# Patient Record
Sex: FEMALE | Race: WHITE | NOT HISPANIC OR LATINO | Employment: PART TIME | RURAL
[De-identification: names, ages, dates, MRNs, and addresses within clinical notes are randomized per-mention and may not be internally consistent; named-entity substitution may affect disease eponyms.]

---
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
## 2024-02-21 ENCOUNTER — PATIENT OUTREACH (OUTPATIENT)
Dept: ADMINISTRATIVE | Facility: HOSPITAL | Age: 19
End: 2024-02-21

## 2024-02-21 NOTE — PROGRESS NOTES
Health maintenance record review for population health care gaps    Patient last visit was on 8/22/23 and decline chlamydia screen

## 2024-04-16 ENCOUNTER — HOSPITAL ENCOUNTER (EMERGENCY)
Facility: HOSPITAL | Age: 19
Discharge: HOME | End: 2024-04-16

## 2024-04-16 VITALS
SYSTOLIC BLOOD PRESSURE: 114 MMHG | WEIGHT: 110 LBS | HEART RATE: 93 BPM | DIASTOLIC BLOOD PRESSURE: 64 MMHG | TEMPERATURE: 98 F | RESPIRATION RATE: 16 BRPM | HEIGHT: 65 IN | BODY MASS INDEX: 18.33 KG/M2 | OXYGEN SATURATION: 100 %

## 2024-04-16 DIAGNOSIS — N39.0 URINARY TRACT INFECTION WITHOUT HEMATURIA, SITE UNSPECIFIED: ICD-10-CM

## 2024-04-16 DIAGNOSIS — B96.89 BACTERIAL VAGINOSIS: Primary | ICD-10-CM

## 2024-04-16 DIAGNOSIS — N76.0 BACTERIAL VAGINOSIS: Primary | ICD-10-CM

## 2024-04-16 LAB
BACTERIA #/AREA URNS HPF: ABNORMAL /HPF
BACTERIA VAG QL WET PREP: ABNORMAL /HPF
BILIRUB UR QL STRIP: NEGATIVE
CLARITY UR: ABNORMAL
CLUE CELLS VAG QL WET PREP: ABNORMAL /HPF
COLOR UR: YELLOW
GLUCOSE UR STRIP-MCNC: NEGATIVE MG/DL
HCG UR QL IA.RAPID: NEGATIVE
KETONES UR STRIP-SCNC: NEGATIVE MG/DL
LEUKOCYTE ESTERASE UR QL STRIP: NEGATIVE
MUCOUS THREADS #/AREA URNS HPF: ABNORMAL /HPF
NITRITE UR QL STRIP: NEGATIVE
PH UR STRIP: 5.5 PH UNITS
PROT UR QL STRIP: NEGATIVE
RBC # UR STRIP: ABNORMAL /UL
RBC #/AREA URNS HPF: ABNORMAL /HPF
RBC #/AREA VAG WET PREP: ABNORMAL /HPF
SP GR UR STRIP: >=1.03
SQUAMOUS #/AREA URNS LPF: ABNORMAL /LPF
SQUAMOUS EPITHELIALS WET WOUNT, GENITAL: ABNORMAL /HPF
T VAGINALIS VAG QL WET PREP: ABNORMAL /HPF
UROBILINOGEN UR STRIP-ACNC: 0.2 MG/DL
WBC #/AREA URNS HPF: ABNORMAL /HPF
WBC CLUMPS WET MOUNT, GENITAL: ABNORMAL /HPF
WBC VAG QL WET PREP: ABNORMAL /HPF
YEAST VAG QL WET PREP: ABNORMAL /HPF

## 2024-04-16 PROCEDURE — 81003 URINALYSIS AUTO W/O SCOPE: CPT | Performed by: NURSE PRACTITIONER

## 2024-04-16 PROCEDURE — 99284 EMERGENCY DEPT VISIT MOD MDM: CPT

## 2024-04-16 PROCEDURE — 99284 EMERGENCY DEPT VISIT MOD MDM: CPT | Mod: ,,, | Performed by: NURSE PRACTITIONER

## 2024-04-16 PROCEDURE — 81025 URINE PREGNANCY TEST: CPT | Performed by: NURSE PRACTITIONER

## 2024-04-16 PROCEDURE — 87210 SMEAR WET MOUNT SALINE/INK: CPT | Performed by: NURSE PRACTITIONER

## 2024-04-16 PROCEDURE — 87591 N.GONORRHOEAE DNA AMP PROB: CPT | Performed by: NURSE PRACTITIONER

## 2024-04-16 RX ORDER — SULFAMETHOXAZOLE AND TRIMETHOPRIM 800; 160 MG/1; MG/1
1 TABLET ORAL 2 TIMES DAILY
Qty: 6 TABLET | Refills: 0 | Status: SHIPPED | OUTPATIENT
Start: 2024-04-16 | End: 2024-04-19

## 2024-04-16 RX ORDER — METRONIDAZOLE 500 MG/1
500 TABLET ORAL EVERY 12 HOURS
Qty: 14 TABLET | Refills: 0 | Status: SHIPPED | OUTPATIENT
Start: 2024-04-16 | End: 2024-04-23

## 2024-04-16 NOTE — DISCHARGE INSTRUCTIONS
-Take Flagyl 500 mg one tab by mouth twice a day x 7 days and complete.  -Do not drink alcohol while on this medication  -Take Bactrim DS twice a day x 3 days  -Increase water intake  -Shower, no tub baths

## 2024-04-16 NOTE — ED TRIAGE NOTES
Patient arrived to the ER c/o of painful urination with lower right back pain that started yesterday. Patient stated she has taken AZO however it has not worked.

## 2024-04-16 NOTE — ED PROVIDER NOTES
Encounter Date: 4/16/2024       History     Chief Complaint   Patient presents with    Dysuria     17 y/o WF arrived to the ED via POV with complaint of dysuria and urinary frequency that started yesterday. Having right lower back and lower abdominal pain today. Has been taking AZO with little relief. Denies fever, discharge, nausea or vomiting. Patient is sexually active with one partner. No history of STD.    The history is provided by the patient.     Review of patient's allergies indicates:  No Known Allergies  No past medical history on file.  No past surgical history on file.  No family history on file.  Social History     Tobacco Use    Smoking status: Never    Smokeless tobacco: Never   Substance Use Topics    Alcohol use: Never    Drug use: Never     Review of Systems   Gastrointestinal:  Positive for abdominal pain. Negative for constipation, diarrhea, nausea and vomiting.   Genitourinary:  Positive for dysuria, flank pain and frequency. Negative for hematuria, pelvic pain, vaginal bleeding and vaginal discharge.   Musculoskeletal:  Positive for back pain. Negative for myalgias.   All other systems reviewed and are negative.      Physical Exam     Initial Vitals [04/16/24 1449]   BP Pulse Resp Temp SpO2   114/64 93 16 97.9 °F (36.6 °C) 100 %      MAP       --         Physical Exam    Nursing note and vitals reviewed.  Constitutional: Vital signs are normal. She appears well-developed and well-nourished. She is cooperative. She does not have a sickly appearance. She does not appear ill.   Cardiovascular:  Normal rate, regular rhythm and normal heart sounds.           Pulmonary/Chest: Effort normal and breath sounds normal.   Abdominal: Abdomen is soft. Bowel sounds are normal. There is abdominal tenderness in the suprapubic area.   No right CVA tenderness.  No left CVA tenderness. There is no rebound and no guarding.   Musculoskeletal:      Cervical back: Normal.      Thoracic back: Normal.      Lumbar  back: Normal.     Neurological: She is alert and oriented to person, place, and time.   Skin: Skin is warm, dry and intact. Capillary refill takes less than 2 seconds.   Psychiatric: She has a normal mood and affect. Her speech is normal and behavior is normal. Judgment normal.         Medical Screening Exam   See Full Note    ED Course   Procedures  Labs Reviewed   WET PREP, GENITAL - Abnormal; Notable for the following components:       Result Value    WBC [place] Rare (*)     Bacteria [place] Few (*)     Clue Cell [place] Few (*)     Squamous Epithelials [place] Many (*)     All other components within normal limits   URINALYSIS, REFLEX TO URINE CULTURE - Abnormal; Notable for the following components:    Blood, UA Small (*)     Specific Gravity, UA >=1.030 (*)     All other components within normal limits   URINALYSIS, MICROSCOPIC - Abnormal; Notable for the following components:    WBC, UA 5-10 (*)     RBC, UA 5-10 (*)     Bacteria, UA Few (*)     Squamous Epithelial Cells, UA Moderate (*)     Mucus, UA Moderate (*)     All other components within normal limits   HCG QUALITATIVE URINE - Normal   CHLAMYDIA/GONORRHOEAE(GC), PCR          Imaging Results    None          Medications - No data to display  Medical Decision Making  19 y/o WF arrived to the ED via POV with complaint of dysuria and urinary frequency that started yesterday. Having right lower back and lower abdominal pain today. Has been taking AZO with little relief. Denies fever, discharge, nausea or vomiting. Patient is sexually active with one partner. No history of STD.    Problems Addressed:  Bacterial vaginosis:     Details: -Take Flagyl 500 mg one tab by mouth twice a day x 7 days and complete.  -Do not drink alcohol while on this medication    Urinary tract infection without hematuria, site unspecified:     Details: -Take Bactrim DS twice a day x 3 days  -Increase water intake  -Shower, no tub baths    Amount and/or Complexity of Data  Reviewed  Labs: ordered.     Details: Labs Reviewed  WET PREP, GENITAL - Abnormal; Notable for the following components:     WBC Wet Mount                 Rare (*)               Bacteria Wet Mount            Few (*)                Clue Cell Wet Mount           Few (*)                Squamous Epithelials Wet [place]   Many (*)            All other components within normal limits  URINALYSIS, REFLEX TO URINE CULTURE - Abnormal; Notable for the following components:     Blood, UA                     Small (*)               Specific Gravity, UA          >=1.030 (*)            All other components within normal limits  URINALYSIS, MICROSCOPIC - Abnormal; Notable for the following components:     WBC, UA                       5-10 (*)               RBC, UA                       5-10 (*)               Bacteria, UA                  Few (*)                Squamous Epithelial Cells, UA   Moderate (*)               Mucus, UA                     Moderate (*)            All other components within normal limits  HCG QUALITATIVE URINE - Normal  CHLAMYDIA/GONORRHOEAE(GC), PCR   Discussion of management or test interpretation with external provider(s): Discharge MDM  I discussed lab results with patient. Will notify patient of GC/Chl pending results.  RX sent into pharmacy for Flagyl and Bactrim DS. Reviewed discharge instructions with patient. She agreed to treatment plan and verbalized understanding.  Patient was discharged in stable condition.  Detailed return precautions discussed.                                       Clinical Impression:   Final diagnoses:  [N76.0, B96.89] Bacterial vaginosis (Primary)  [N39.0] Urinary tract infection without hematuria, site unspecified        ED Disposition Condition    Discharge Stable          ED Prescriptions       Medication Sig Dispense Start Date End Date Auth. Provider    sulfamethoxazole-trimethoprim 800-160mg (BACTRIM DS) 800-160 mg Tab Take 1 tablet by mouth 2 (two) times daily. for  3 days 6 tablet 4/16/2024 4/19/2024 Michelle Dumont, RAFIAP    metroNIDAZOLE (FLAGYL) 500 MG tablet Take 1 tablet (500 mg total) by mouth every 12 (twelve) hours. for 7 days 14 tablet 4/16/2024 4/23/2024 Michelle Dumont, ZABRINA          Follow-up Information    None          Michelle Dumont, ZABRINA  04/16/24 [id number]

## 2024-04-17 LAB
CHLAMYDIA BY PCR: NEGATIVE
N. GONORRHOEAE (GC) BY PCR: NEGATIVE

## 2024-04-19 ENCOUNTER — TELEPHONE (OUTPATIENT)
Dept: EMERGENCY MEDICINE | Facility: HOSPITAL | Age: 19
End: 2024-04-19

## 2024-04-19 NOTE — TELEPHONE ENCOUNTER
Patient states doing a lot better.  Instructed to take all her antibiotics and follow up with her PCP.

## 2024-06-03 ENCOUNTER — HOSPITAL ENCOUNTER (EMERGENCY)
Facility: HOSPITAL | Age: 19
Discharge: HOME | End: 2024-06-04
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
## 2024-06-03 PROCEDURE — 99284 EMERGENCY DEPT VISIT MOD MDM: CPT | Mod: ,,, | Performed by: REGISTERED NURSE

## 2024-06-03 PROCEDURE — 99284 EMERGENCY DEPT VISIT MOD MDM: CPT

## 2024-06-03 NOTE — Clinical Note
"Heidy HELTON" Katrina was seen and treated in our emergency department on 6/3/2024.  She may return to work on 06/05/2024.       If you have any questions or concerns, please don't hesitate to call.      Hayden Whatley, NP-C"

## 2024-06-04 VITALS
OXYGEN SATURATION: 100 % | RESPIRATION RATE: 17 BRPM | TEMPERATURE: 98 F | WEIGHT: 120 LBS | DIASTOLIC BLOOD PRESSURE: 59 MMHG | SYSTOLIC BLOOD PRESSURE: 103 MMHG | HEIGHT: 65 IN | BODY MASS INDEX: 19.99 KG/M2 | HEART RATE: 96 BPM

## 2024-06-04 LAB
AMPHET UR QL SCN: NEGATIVE
BARBITURATES UR QL SCN: NEGATIVE
BENZODIAZ METAB UR QL SCN: NEGATIVE
CANNABINOIDS UR QL SCN: POSITIVE
COCAINE UR QL SCN: NEGATIVE
ETHANOL SERPL-MCNC: <3 MG/DL
HCG UR QL IA.RAPID: NEGATIVE
LACTATE SERPL-SCNC: 1 MMOL/L (ref 0.4–2)
OPIATES UR QL SCN: NEGATIVE
PCP UR QL SCN: NEGATIVE

## 2024-06-04 PROCEDURE — 83605 ASSAY OF LACTIC ACID: CPT | Performed by: REGISTERED NURSE

## 2024-06-04 PROCEDURE — 81025 URINE PREGNANCY TEST: CPT | Performed by: REGISTERED NURSE

## 2024-06-04 PROCEDURE — 82077 ASSAY SPEC XCP UR&BREATH IA: CPT | Performed by: REGISTERED NURSE

## 2024-06-04 PROCEDURE — 36415 COLL VENOUS BLD VENIPUNCTURE: CPT | Performed by: REGISTERED NURSE

## 2024-06-04 PROCEDURE — 25000003 PHARM REV CODE 250: Performed by: REGISTERED NURSE

## 2024-06-04 PROCEDURE — 80307 DRUG TEST PRSMV CHEM ANLYZR: CPT | Performed by: REGISTERED NURSE

## 2024-06-04 RX ORDER — ONDANSETRON 4 MG/1
4 TABLET, ORALLY DISINTEGRATING ORAL
Status: COMPLETED | OUTPATIENT
Start: 2024-06-04 | End: 2024-06-04

## 2024-06-04 RX ADMIN — ONDANSETRON 4 MG: 4 TABLET, ORALLY DISINTEGRATING ORAL at 12:06

## 2024-06-04 NOTE — ED TRIAGE NOTES
Patient presents to ER with complaints of nausea, vomiting, headache; reports she remembers waking up on the ground beside the bed, boyfriend picked her up, put her back in the bed, NV and headache started after. Denies any drugs or alcohol.

## 2024-06-04 NOTE — ED PROVIDER NOTES
Encounter Date: 6/3/2024       History     Chief Complaint   Patient presents with    Nausea    Vomiting     fall     18 y-old  female received to ED with complaint of n/v and possible fall/ head injury. Patient is very vague with the onset of her symptoms. States that she woke up and her boyfriend was putting her back in the bed. C/o headache. Patient is AAOX3, Eye VIRAJ, Speech clear/appropriate, MCKEON without any difficulty.       Review of patient's allergies indicates:  No Known Allergies  History reviewed. No pertinent past medical history.  History reviewed. No pertinent surgical history.  No family history on file.  Social History     Tobacco Use    Smoking status: Every Day     Types: Vaping with nicotine    Smokeless tobacco: Never   Substance Use Topics    Alcohol use: Never    Drug use: Never     Review of Systems   Constitutional: Negative.    HENT: Negative.     Eyes: Negative.    Respiratory: Negative.     Cardiovascular: Negative.    Gastrointestinal:  Positive for nausea and vomiting.   Endocrine: Negative.    Genitourinary: Negative.    Musculoskeletal: Negative.    Skin: Negative.    Allergic/Immunologic: Negative.    Neurological: Negative.    Hematological: Negative.    Psychiatric/Behavioral: Negative.         Physical Exam     Initial Vitals [06/03/24 2358]   BP Pulse Resp Temp SpO2   118/71 100 16 97.6 °F (36.4 °C) 100 %      MAP       --         Physical Exam    Nursing note and vitals reviewed.  Constitutional: She appears well-developed and well-nourished.   HENT:   Head: Normocephalic and atraumatic.   Right Ear: External ear normal.   Left Ear: External ear normal.   Nose: Nose normal.   Mouth/Throat: Oropharynx is clear and moist.   Eyes: Conjunctivae are normal.   Neck: Neck supple.   Normal range of motion.  Cardiovascular:  Normal rate, regular rhythm, normal heart sounds and intact distal pulses.           Pulmonary/Chest: Breath sounds normal.   Abdominal: Abdomen is soft.  Bowel sounds are normal.   Musculoskeletal:         General: Normal range of motion.      Cervical back: Normal range of motion and neck supple.     Neurological: She is alert and oriented to person, place, and time. She has normal strength. GCS score is 15. GCS eye subscore is 4. GCS verbal subscore is 5. GCS motor subscore is 6.   Skin: Skin is warm and dry. Capillary refill takes less than 2 seconds.   Psychiatric: She has a normal mood and affect. Her behavior is normal. Judgment and thought content normal.         Medical Screening Exam   See Full Note    ED Course   Procedures  Labs Reviewed   DRUG SCREEN, URINE (BEAKER) - Abnormal; Notable for the following components:       Result Value    Cannabinoid, Urine Positive (*)     All other components within normal limits    Narrative:     The results of screening tests should be considered presumptive. Confirmatory testing is available upon request.    Cutoff Points:  PCP:         25ng/mL  AMPH:        500ng/mL  GILDA:        200ng/mL  MADY:        200ng/mL  THC:         50ng/mL  ADALBERTO:         300ng/mL  OPI:         2000ng/mL   HCG QUALITATIVE URINE - Normal   LACTIC ACID, PLASMA - Normal   ALCOHOL,MEDICAL (ETHANOL) - Normal          Imaging Results              CT Head Without Contrast (In process)                      Medications   ondansetron disintegrating tablet 4 mg (4 mg Oral Given 6/4/24 0009)     Medical Decision Making  18 y-old  female received to ED with complaint of n/v and possible fall/ head injury. Patient is very vague with the onset of her symptoms. States that she woke up and her boyfriend was putting her back in the bed. C/o headache. Patient is AAOX3, Eye VIRAJ, Speech clear/appropriate, MCKEON without any difficulty.       Amount and/or Complexity of Data Reviewed  Radiology: ordered.    Risk  Prescription drug management.  Risk Details: CT negative, lactic of 1, no vomiting. Recommend supportive care and f/u as needed                                       Clinical Impression:   Final diagnoses:  [W19.XXXA] Fall, initial encounter (Primary)        ED Disposition Condition    Discharge Stable          ED Prescriptions    None       Follow-up Information       Follow up With Specialties Details Why Contact Info    Your regular doctor  Schedule an appointment as soon as possible for a visit  As needed              Hayden Whatley, NP-C  06/04/24 0223

## 2024-06-05 ENCOUNTER — TELEPHONE (OUTPATIENT)
Dept: EMERGENCY MEDICINE | Facility: HOSPITAL | Age: 19
End: 2024-06-05

## 2024-10-02 ENCOUNTER — HOSPITAL ENCOUNTER (EMERGENCY)
Facility: HOSPITAL | Age: 19
Discharge: HOME | End: 2024-10-02

## 2024-10-02 VITALS
HEIGHT: 65 IN | SYSTOLIC BLOOD PRESSURE: 114 MMHG | RESPIRATION RATE: 20 BRPM | BODY MASS INDEX: 20.83 KG/M2 | HEART RATE: 98 BPM | WEIGHT: 125 LBS | DIASTOLIC BLOOD PRESSURE: 68 MMHG | TEMPERATURE: 98 F | OXYGEN SATURATION: 100 %

## 2024-10-02 DIAGNOSIS — K59.00 CONSTIPATION, UNSPECIFIED CONSTIPATION TYPE: Primary | ICD-10-CM

## 2024-10-02 LAB
ALBUMIN SERPL BCP-MCNC: 4 G/DL (ref 3.5–5)
ALBUMIN/GLOB SERPL: 0.9 {RATIO}
ALP SERPL-CCNC: 62 U/L (ref 52–144)
ALT SERPL W P-5'-P-CCNC: 13 U/L (ref 13–56)
ANION GAP SERPL CALCULATED.3IONS-SCNC: 14 MMOL/L (ref 7–16)
AST SERPL W P-5'-P-CCNC: 18 U/L (ref 15–37)
BASOPHILS # BLD AUTO: 0.02 K/UL (ref 0–0.2)
BASOPHILS NFR BLD AUTO: 0.5 % (ref 0–1)
BILIRUB SERPL-MCNC: 0.4 MG/DL (ref ?–1.2)
BILIRUB UR QL STRIP: NEGATIVE
BUN SERPL-MCNC: 14 MG/DL (ref 7–18)
BUN/CREAT SERPL: 14 (ref 6–20)
CALCIUM SERPL-MCNC: 9 MG/DL (ref 8.5–10.1)
CHLORIDE SERPL-SCNC: 108 MMOL/L (ref 98–107)
CLARITY UR: CLEAR
CO2 SERPL-SCNC: 26 MMOL/L (ref 21–32)
COLOR UR: YELLOW
CREAT SERPL-MCNC: 1.02 MG/DL (ref 0.55–1.02)
DIFFERENTIAL METHOD BLD: ABNORMAL
EGFR (NO RACE VARIABLE) (RUSH/TITUS): 81 ML/MIN/1.73M2
EOSINOPHIL # BLD AUTO: 0.04 K/UL (ref 0–0.5)
EOSINOPHIL NFR BLD AUTO: 1 % (ref 1–4)
ERYTHROCYTE [DISTWIDTH] IN BLOOD BY AUTOMATED COUNT: 14.3 % (ref 11.5–14.5)
GLOBULIN SER-MCNC: 4.3 G/DL (ref 2–4)
GLUCOSE SERPL-MCNC: 87 MG/DL (ref 74–106)
GLUCOSE UR STRIP-MCNC: NEGATIVE MG/DL
HCG UR QL IA.RAPID: NEGATIVE
HCT VFR BLD AUTO: 37.8 % (ref 38–47)
HGB BLD-MCNC: 12.1 G/DL (ref 12–16)
KETONES UR STRIP-SCNC: NEGATIVE MG/DL
LEUKOCYTE ESTERASE UR QL STRIP: NEGATIVE
LYMPHOCYTES # BLD AUTO: 1.41 K/UL (ref 1–4.8)
LYMPHOCYTES NFR BLD AUTO: 36.2 % (ref 27–41)
LYMPHOCYTES NFR BLD MANUAL: 41 % (ref 27–41)
MCH RBC QN AUTO: 25.3 PG (ref 27–31)
MCHC RBC AUTO-ENTMCNC: 32 G/DL (ref 32–36)
MCV RBC AUTO: 79.1 FL (ref 80–96)
MONOCYTES # BLD AUTO: 0.6 K/UL (ref 0–0.8)
MONOCYTES NFR BLD AUTO: 15.4 % (ref 2–6)
MONOCYTES NFR BLD MANUAL: 8 % (ref 2–6)
MPC BLD CALC-MCNC: 9.9 FL (ref 9.4–12.4)
NEUTROPHILS # BLD AUTO: 1.82 K/UL (ref 1.8–7.7)
NEUTROPHILS NFR BLD AUTO: 46.9 % (ref 53–65)
NEUTS SEG NFR BLD MANUAL: 51 % (ref 50–62)
NITRITE UR QL STRIP: NEGATIVE
NRBC BLD MANUAL-RTO: ABNORMAL %
PH UR STRIP: 6 PH UNITS
PLATELET # BLD AUTO: 305 K/UL (ref 150–400)
PLATELET MORPHOLOGY: NORMAL
POTASSIUM SERPL-SCNC: 3.8 MMOL/L (ref 3.5–5.1)
PROT SERPL-MCNC: 8.3 G/DL (ref 6.4–8.2)
PROT UR QL STRIP: NEGATIVE
RBC # BLD AUTO: 4.78 M/UL (ref 4.2–5.4)
RBC # UR STRIP: ABNORMAL /UL
RBC MORPH BLD: NORMAL
SODIUM SERPL-SCNC: 144 MMOL/L (ref 136–145)
SP GR UR STRIP: >=1.03
UROBILINOGEN UR STRIP-ACNC: 0.2 MG/DL
WBC # BLD AUTO: 3.89 K/UL (ref 4.5–11)

## 2024-10-02 PROCEDURE — 99284 EMERGENCY DEPT VISIT MOD MDM: CPT | Mod: ,,, | Performed by: NURSE PRACTITIONER

## 2024-10-02 PROCEDURE — 81003 URINALYSIS AUTO W/O SCOPE: CPT | Performed by: NURSE PRACTITIONER

## 2024-10-02 PROCEDURE — 80053 COMPREHEN METABOLIC PANEL: CPT | Performed by: NURSE PRACTITIONER

## 2024-10-02 PROCEDURE — 96374 THER/PROPH/DIAG INJ IV PUSH: CPT

## 2024-10-02 PROCEDURE — 25000003 PHARM REV CODE 250: Performed by: NURSE PRACTITIONER

## 2024-10-02 PROCEDURE — 85025 COMPLETE CBC W/AUTO DIFF WBC: CPT | Performed by: NURSE PRACTITIONER

## 2024-10-02 PROCEDURE — 96375 TX/PRO/DX INJ NEW DRUG ADDON: CPT

## 2024-10-02 PROCEDURE — 63600175 PHARM REV CODE 636 W HCPCS: Performed by: NURSE PRACTITIONER

## 2024-10-02 PROCEDURE — 36415 COLL VENOUS BLD VENIPUNCTURE: CPT | Performed by: NURSE PRACTITIONER

## 2024-10-02 PROCEDURE — 81025 URINE PREGNANCY TEST: CPT | Performed by: NURSE PRACTITIONER

## 2024-10-02 PROCEDURE — 99284 EMERGENCY DEPT VISIT MOD MDM: CPT | Mod: 25

## 2024-10-02 RX ORDER — SYRING-NEEDL,DISP,INSUL,0.3 ML 29 G X1/2"
296 SYRINGE, EMPTY DISPOSABLE MISCELLANEOUS
Status: COMPLETED | OUTPATIENT
Start: 2024-10-02 | End: 2024-10-02

## 2024-10-02 RX ORDER — ONDANSETRON HYDROCHLORIDE 2 MG/ML
4 INJECTION, SOLUTION INTRAVENOUS
Status: COMPLETED | OUTPATIENT
Start: 2024-10-02 | End: 2024-10-02

## 2024-10-02 RX ORDER — KETOROLAC TROMETHAMINE 30 MG/ML
15 INJECTION, SOLUTION INTRAMUSCULAR; INTRAVENOUS
Status: COMPLETED | OUTPATIENT
Start: 2024-10-02 | End: 2024-10-02

## 2024-10-02 RX ADMIN — ONDANSETRON 4 MG: 2 INJECTION INTRAMUSCULAR; INTRAVENOUS at 11:10

## 2024-10-02 RX ADMIN — MAGNESIUM CITRATE 296 ML: 1.75 LIQUID ORAL at 12:10

## 2024-10-02 RX ADMIN — KETOROLAC TROMETHAMINE 15 MG: 30 INJECTION, SOLUTION INTRAMUSCULAR at 11:10

## 2024-10-02 NOTE — DISCHARGE INSTRUCTIONS
Make sure you drink plenty of fluids, especially water. Eat plenty of fresh fruits and vegetables. Tylenol and Ibuprofen as needed for pain. Remain as active as possible. Follow up with your primary care provider if no improvement or otherwise as needed. Return to the ED for worsening signs and symptoms or otherwise as needed.

## 2024-10-02 NOTE — ED PROVIDER NOTES
"Encounter Date: 10/2/2024       History     Chief Complaint   Patient presents with    Abdominal Pain     19 y/o WF presents to the emergency department with c/o abdominal pain that started 2 days ago. She states the pain initially improved but around 2AM this morning the pain returned and has been "sharp and severe" since that time. She states that if she bends over it does help the pain some but if she sits straight up or lies down it makes the pain worse. She denies fevers, chills, nausea, vomiting. She states she initially thought she may be constipated but she has been having regular, normal BM, last being today. She denies dysuria or hematuria. She states she is currently on her menstrual cycle. She has never been pregnant. She has taken nothing for her symptoms. She states she vapes nicotine daily. She denies alcohol or illicit drug use.      The history is provided by the patient.     Review of patient's allergies indicates:  No Known Allergies  History reviewed. No pertinent past medical history.  History reviewed. No pertinent surgical history.  No family history on file.  Social History     Tobacco Use    Smoking status: Every Day     Types: Vaping with nicotine    Smokeless tobacco: Never   Substance Use Topics    Alcohol use: Never    Drug use: Never     Review of Systems   All other systems reviewed and are negative.      Physical Exam     Initial Vitals [10/02/24 1136]   BP Pulse Resp Temp SpO2   120/73 85 20 97.9 °F (36.6 °C) 100 %      MAP       --         Physical Exam    Constitutional: She appears well-developed and well-nourished. She is cooperative.  Non-toxic appearance.   Cardiovascular:  Normal rate, regular rhythm and normal heart sounds.           Pulmonary/Chest: Effort normal and breath sounds normal.   Abdominal: Abdomen is soft. Bowel sounds are normal. There is no abdominal tenderness. There is no rebound and no guarding.     Neurological: She is alert and oriented to person, place, " and time.   Skin: Skin is warm, dry and intact. Capillary refill takes less than 2 seconds.         Medical Screening Exam   See Full Note    ED Course   Procedures  Labs Reviewed   URINALYSIS, REFLEX TO URINE CULTURE - Abnormal       Result Value    Color, UA Yellow      Clarity, UA Clear      pH, UA 6.0      Leukocytes, UA Negative      Nitrites, UA Negative      Protein, UA Negative      Glucose, UA Negative      Ketones, UA Negative      Urobilinogen, UA 0.2      Bilirubin, UA Negative      Blood, UA Large (*)     Specific Gravity, UA >=1.030 (*)    COMPREHENSIVE METABOLIC PANEL - Abnormal    Sodium 144      Potassium 3.8      Chloride 108 (*)     CO2 26      Anion Gap 14      Glucose 87      BUN 14      Creatinine 1.02      BUN/Creatinine Ratio 14      Calcium 9.0      Total Protein 8.3 (*)     Albumin 4.0      Globulin 4.3 (*)     A/G Ratio 0.9      Bilirubin, Total 0.4      Alk Phos 62      ALT 13      AST 18      eGFR 81     CBC WITH DIFFERENTIAL - Abnormal    WBC 3.89 (*)     RBC 4.78      Hemoglobin 12.1      Hematocrit 37.8 (*)     MCV 79.1 (*)     MCH 25.3 (*)     MCHC 32.0      RDW 14.3      Platelet Count 305      MPV 9.9      Neutrophils % 46.9 (*)     Lymphocytes % 36.2      Neutrophils, Abs 1.82      Lymphocytes, Absolute 1.41      Diff Type Manual      Monocytes % 15.4 (*)     Eosinophils % 1.0      Basophils % 0.5      Monocytes, Absolute 0.60      Eosinophils, Absolute 0.04      Basophils, Absolute 0.02     MANUAL DIFFERENTIAL - Abnormal    Segmented Neutrophils, Man % 51      Lymphocytes, Man % 41      Monocytes, Man % 8 (*)     nRBC, Manual        Platelet Morphology Normal      RBC Morphology Normal     HCG QUALITATIVE URINE - Normal    HCG Qualitative, Urine Negative     CBC W/ AUTO DIFFERENTIAL    Narrative:     The following orders were created for panel order CBC auto differential.  Procedure                               Abnormality         Status                     ---------              "                  -----------         ------                     CBC with Differential[[id number]]       Abnormal            Final result               Manual Differential[[id number]]         Abnormal            Final result                 Please view results for these tests on the individual orders.          Imaging Results              X-Ray KUB (Final result)  Result time 10/02/24 12:26:14      Final result by Garland Escobar MD (10/02/24 12:26:14)                   Impression:      Nonspecific, nonobstructive bowel gas pattern.    Moderate volume colonic stool.      Electronically signed by: Garland Escobar  Date:    10/02/2024  Time:    12:26               Narrative:    EXAMINATION:  XR KUB    CLINICAL HISTORY:  abd pain;    TECHNIQUE:  Single AP supine view of the abdomen (KUB) was performed    COMPARISON:  None    FINDINGS:  No definite dilated gas-filled loops of small large bowel appreciated.  Moderate volume colonic stool.    No definite radiopaque foreign body.  No acute osseous or soft tissue findings are seen.                                       Medications   ondansetron injection 4 mg (4 mg Intravenous Given 10/2/24 1158)   ketorolac injection 15 mg (15 mg Intravenous Given 10/2/24 1158)   magnesium citrate solution 296 mL (296 mLs Oral Given 10/2/24 1233)     Medical Decision Making  19 y/o WF presents to the emergency department with c/o abdominal pain that started 2 days ago. She states the pain initially improved but around 2AM this morning the pain returned and has been "sharp and severe" since that time. She states that if she bends over it does help the pain some but if she sits straight up or lies down it makes the pain worse. She denies fevers, chills, nausea, vomiting. She states she initially thought she may be constipated but she has been having regular, normal BM, last being today. She denies dysuria or hematuria. She states she is currently on her menstrual cycle. She has never been " pregnant. She has taken nothing for her symptoms. She states she vapes nicotine daily. She denies alcohol or illicit drug use.      Problems Addressed:  Constipation, unspecified constipation type:     Details: Patient afebrile, non toxic appearing. No red flags on exam. WBC count 3.89. H/H normal. Renal fx and electrolytes ok. Magnesium citrate given. Counseled on supportive measures. Follow up instructions given. Warning s/s discussed and return precautions given; the patient has v/u.      Amount and/or Complexity of Data Reviewed  Labs: ordered.  Radiology: ordered.    Risk  OTC drugs.  Prescription drug management.                                      Clinical Impression:   Final diagnoses:  [K59.00] Constipation, unspecified constipation type (Primary)        ED Disposition Condition    Discharge Stable          ED Prescriptions    None       Follow-up Information       Follow up With Specialties Details Why Contact Info    Primary Care Provider   As needed              Wanda Horne FNP  10/02/24 4793

## 2024-10-02 NOTE — ED TRIAGE NOTES
Pt to ED with c/o abdominal pain that radiates from midline just above the belly button around to her back. Pt states pain initially started 2 days ago and went away but returned worse during the night. Pt describes pain as sharp and stabbing and states she can only get relief when she leans forward and puts pressure on her abdomen.

## 2024-11-04 ENCOUNTER — HOSPITAL ENCOUNTER (EMERGENCY)
Facility: HOSPITAL | Age: 19
Discharge: HOME | End: 2024-11-04

## 2024-11-04 VITALS
BODY MASS INDEX: 21.66 KG/M2 | DIASTOLIC BLOOD PRESSURE: 66 MMHG | SYSTOLIC BLOOD PRESSURE: 108 MMHG | WEIGHT: 130 LBS | HEIGHT: 65 IN | TEMPERATURE: 97 F | RESPIRATION RATE: 20 BRPM | OXYGEN SATURATION: 98 % | HEART RATE: 77 BPM

## 2024-11-04 DIAGNOSIS — K52.9 GASTROENTERITIS: Primary | ICD-10-CM

## 2024-11-04 PROCEDURE — 63600175 PHARM REV CODE 636 W HCPCS: Performed by: NURSE PRACTITIONER

## 2024-11-04 PROCEDURE — 96372 THER/PROPH/DIAG INJ SC/IM: CPT | Performed by: NURSE PRACTITIONER

## 2024-11-04 PROCEDURE — 99284 EMERGENCY DEPT VISIT MOD MDM: CPT | Mod: ,,, | Performed by: NURSE PRACTITIONER

## 2024-11-04 PROCEDURE — 99284 EMERGENCY DEPT VISIT MOD MDM: CPT | Mod: 25

## 2024-11-04 RX ORDER — ONDANSETRON HYDROCHLORIDE 2 MG/ML
8 INJECTION, SOLUTION INTRAVENOUS
Status: COMPLETED | OUTPATIENT
Start: 2024-11-04 | End: 2024-11-04

## 2024-11-04 RX ORDER — KETOROLAC TROMETHAMINE 30 MG/ML
60 INJECTION, SOLUTION INTRAMUSCULAR; INTRAVENOUS
Status: COMPLETED | OUTPATIENT
Start: 2024-11-04 | End: 2024-11-04

## 2024-11-04 RX ADMIN — KETOROLAC TROMETHAMINE 60 MG: 30 INJECTION, SOLUTION INTRAMUSCULAR at 05:11

## 2024-11-04 RX ADMIN — ONDANSETRON 8 MG: 2 INJECTION INTRAMUSCULAR; INTRAVENOUS at 05:11

## 2024-11-04 NOTE — ED PROVIDER NOTES
Encounter Date: 11/4/2024       History     Chief Complaint   Patient presents with    Vomiting    Headache    Nausea     Pt states her symptoms began Friday and have persisted. She was seen at [facility] ED last night at 2300 and was given a shot for nausea she states. Pt now presents to the ED for evaluation and states her symptoms continue.      Nausea / Vomiting  Patient complains of nausea and vomiting. Onset of symptoms was a few hours ago. Patient describes nausea as moderate. Vomiting has occurred several times over the past few hours. Vomitus is described as clear. Symptoms have been associated with inability to keep down fluids. Patient denies alcohol overuse, fever, and possibility of pregnancy. Symptoms have waxed and waned. Evaluation to date has been seen in ER: was seen at [facility] ER on 11/02/2024. Treatment was effective. She was prescribed Zofran, but did not get the prescription filled..         The history is provided by the patient. A  was used.     Review of patient's allergies indicates:  No Known Allergies  History reviewed. No pertinent past medical history.  History reviewed. No pertinent surgical history.  No family history on file.  Social History     Tobacco Use    Smoking status: Every Day     Types: Vaping with nicotine    Smokeless tobacco: Never   Substance Use Topics    Alcohol use: Never    Drug use: Never     Review of Systems   Constitutional:  Negative for activity change, appetite change, fatigue, fever and unexpected weight change.   Respiratory:  Negative for cough and shortness of breath.    Cardiovascular:  Negative for chest pain and leg swelling.   Gastrointestinal:  Positive for nausea and vomiting. Negative for abdominal pain, constipation and diarrhea.   Genitourinary:  Negative for difficulty urinating.   Neurological:  Positive for headaches. Negative for tremors, syncope and light-headedness.   Psychiatric/Behavioral:  Negative for sleep  disturbance and suicidal ideas.        Physical Exam     Initial Vitals [11/04/24 0327]   BP Pulse Resp Temp SpO2   108/66 77 20 97.3 °F (36.3 °C) 98 %      MAP       --         Physical Exam    Constitutional: Vital signs are normal. She appears well-developed and well-nourished. She is cooperative.   HENT:   Head: Normocephalic and atraumatic.   Right Ear: Hearing normal.   Left Ear: Hearing normal.   Nose: Nose normal. Mouth/Throat: Mucous membranes are normal.   Eyes: Conjunctivae and lids are normal. Right eye exhibits no nystagmus. Left eye exhibits no nystagmus.   Neck: Trachea normal.   Normal range of motion.  Cardiovascular:  Normal rate and regular rhythm.           No murmur heard.  Musculoskeletal:      Cervical back: Normal range of motion.     Neurological: She is alert and oriented to person, place, and time. Gait normal.   Skin: Skin is warm.   Psychiatric: She has a normal mood and affect. Her speech is normal and behavior is normal. Judgment and thought content normal. Cognition and memory are normal.         Medical Screening Exam   See Full Note    ED Course   Procedures  Labs Reviewed - No data to display       Imaging Results    None          Medications   ketorolac injection 60 mg (has no administration in time range)   ondansetron injection 8 mg (has no administration in time range)     Medical Decision Making  ROMMEL is a 19 y/o  female who presents to ED this a.m. via private vehicle for complaints of nausea vomiting in pressure-like headache.  She initially endorses she was seen at the ER and showed the yesterday but upon review of discharge paperwork she was seen on 11/02/2024.  She was provided with a Zofran prescription for which she never got filled.  She states she was given a shot while she was in the ER the bed feel better but it did not last long.  Denies any fever. She has taken ibuprofen at home for headache.  Describes vomitus is clear    Problems  Addressed:  Gastroenteritis: self-limited or minor problem     Details: Advised patient to get prescription filled that was provided to her at her previous ER visit  IM Toradol 60 mg in IM Zofran 4 mg given in ED    Risk  Prescription drug management.  Risk Details: Differential diagnoses include gastroenteritis & migraine headache          Clinical Impression:   Final diagnoses:  [K52.9] Gastroenteritis (Primary)          Sonia Summers, ZABRINA  11/04/24 0502

## 2025-06-17 ENCOUNTER — HOSPITAL ENCOUNTER (EMERGENCY)
Facility: HOSPITAL | Age: 20
Discharge: ANOTHER HOSPITAL | End: 2025-06-17

## 2025-06-17 VITALS
RESPIRATION RATE: 18 BRPM | HEIGHT: 64 IN | TEMPERATURE: 98 F | HEART RATE: 92 BPM | SYSTOLIC BLOOD PRESSURE: 127 MMHG | WEIGHT: 125 LBS | BODY MASS INDEX: 21.34 KG/M2 | DIASTOLIC BLOOD PRESSURE: 55 MMHG | OXYGEN SATURATION: 100 %

## 2025-06-17 DIAGNOSIS — N83.511 TORSION OF RIGHT OVARY: Primary | ICD-10-CM

## 2025-06-17 DIAGNOSIS — R10.31 RIGHT LOWER QUADRANT ABDOMINAL PAIN: ICD-10-CM

## 2025-06-17 LAB
ALBUMIN SERPL BCP-MCNC: 4.5 G/DL (ref 3.5–5)
ALBUMIN/GLOB SERPL: 1.4 {RATIO}
ALP SERPL-CCNC: 47 U/L (ref 40–150)
ALT SERPL W P-5'-P-CCNC: 11 U/L
AMPHET UR QL SCN: NEGATIVE
ANION GAP SERPL CALCULATED.3IONS-SCNC: 13 MMOL/L (ref 7–16)
AST SERPL W P-5'-P-CCNC: 28 U/L (ref 11–45)
BARBITURATES UR QL SCN: NEGATIVE
BASOPHILS # BLD AUTO: 0.02 K/UL (ref 0–0.2)
BASOPHILS NFR BLD AUTO: 0.2 % (ref 0–1)
BENZODIAZ METAB UR QL SCN: NEGATIVE
BILIRUB SERPL-MCNC: 0.3 MG/DL
BILIRUB UR QL STRIP: NEGATIVE
BUN SERPL-MCNC: 13 MG/DL (ref 7–19)
BUN/CREAT SERPL: 12 (ref 6–20)
CALCIUM SERPL-MCNC: 9.3 MG/DL (ref 8.4–10.2)
CANNABINOIDS UR QL SCN: NEGATIVE
CHLORIDE SERPL-SCNC: 109 MMOL/L (ref 98–107)
CLARITY UR: CLEAR
CO2 SERPL-SCNC: 23 MMOL/L (ref 22–29)
COCAINE UR QL SCN: NEGATIVE
COLOR UR: YELLOW
CREAT SERPL-MCNC: 1.05 MG/DL (ref 0.55–1.02)
DIFFERENTIAL METHOD BLD: ABNORMAL
EGFR (NO RACE VARIABLE) (RUSH/TITUS): 78 ML/MIN/1.73M2
EOSINOPHIL # BLD AUTO: 0.06 K/UL (ref 0–0.5)
EOSINOPHIL NFR BLD AUTO: 0.5 % (ref 1–4)
ERYTHROCYTE [DISTWIDTH] IN BLOOD BY AUTOMATED COUNT: 13.9 % (ref 11.5–14.5)
GLOBULIN SER-MCNC: 3.2 G/DL (ref 2–4)
GLUCOSE SERPL-MCNC: 96 MG/DL (ref 74–100)
GLUCOSE UR STRIP-MCNC: NEGATIVE MG/DL
HCG UR QL IA.RAPID: NEGATIVE
HCT VFR BLD AUTO: 37.1 % (ref 38–47)
HGB BLD-MCNC: 11.7 G/DL (ref 12–16)
KETONES UR STRIP-SCNC: NEGATIVE MG/DL
LEUKOCYTE ESTERASE UR QL STRIP: NEGATIVE
LIPASE SERPL-CCNC: 10 U/L
LYMPHOCYTES # BLD AUTO: 2.92 K/UL (ref 1–4.8)
LYMPHOCYTES NFR BLD AUTO: 22.8 % (ref 27–41)
MAGNESIUM SERPL-MCNC: 2 MG/DL (ref 1.6–2.6)
MCH RBC QN AUTO: 25.4 PG (ref 27–31)
MCHC RBC AUTO-ENTMCNC: 31.5 G/DL (ref 32–36)
MCV RBC AUTO: 80.5 FL (ref 80–96)
MONOCYTES # BLD AUTO: 0.89 K/UL (ref 0–0.8)
MONOCYTES NFR BLD AUTO: 6.9 % (ref 2–6)
MPC BLD CALC-MCNC: 10.2 FL (ref 9.4–12.4)
NEUTROPHILS # BLD AUTO: 8.92 K/UL (ref 1.8–7.7)
NEUTROPHILS NFR BLD AUTO: 69.6 % (ref 53–65)
NITRITE UR QL STRIP: NEGATIVE
OPIATES UR QL SCN: NEGATIVE
PCP UR QL SCN: NEGATIVE
PH UR STRIP: 6 PH UNITS
PLATELET # BLD AUTO: 347 K/UL (ref 150–400)
POTASSIUM SERPL-SCNC: 3.7 MMOL/L (ref 3.5–5.1)
PROT SERPL-MCNC: 7.7 G/DL (ref 6.4–8.3)
PROT UR QL STRIP: NEGATIVE
RBC # BLD AUTO: 4.61 M/UL (ref 4.2–5.4)
RBC # UR STRIP: NEGATIVE /UL
SODIUM SERPL-SCNC: 141 MMOL/L (ref 136–145)
SP GR UR STRIP: 1.01
UROBILINOGEN UR STRIP-ACNC: 0.2 MG/DL
WBC # BLD AUTO: 12.81 K/UL (ref 4.5–11)

## 2025-06-17 PROCEDURE — 99285 EMERGENCY DEPT VISIT HI MDM: CPT | Mod: ,,, | Performed by: NURSE PRACTITIONER

## 2025-06-17 PROCEDURE — 63600175 PHARM REV CODE 636 W HCPCS: Performed by: NURSE PRACTITIONER

## 2025-06-17 PROCEDURE — 81025 URINE PREGNANCY TEST: CPT | Performed by: NURSE PRACTITIONER

## 2025-06-17 PROCEDURE — 80307 DRUG TEST PRSMV CHEM ANLYZR: CPT | Performed by: NURSE PRACTITIONER

## 2025-06-17 PROCEDURE — 96365 THER/PROPH/DIAG IV INF INIT: CPT

## 2025-06-17 PROCEDURE — 96375 TX/PRO/DX INJ NEW DRUG ADDON: CPT

## 2025-06-17 PROCEDURE — 85025 COMPLETE CBC W/AUTO DIFF WBC: CPT | Performed by: NURSE PRACTITIONER

## 2025-06-17 PROCEDURE — 83735 ASSAY OF MAGNESIUM: CPT | Performed by: NURSE PRACTITIONER

## 2025-06-17 PROCEDURE — 99285 EMERGENCY DEPT VISIT HI MDM: CPT | Mod: 25

## 2025-06-17 PROCEDURE — 25000003 PHARM REV CODE 250: Performed by: NURSE PRACTITIONER

## 2025-06-17 PROCEDURE — 80053 COMPREHEN METABOLIC PANEL: CPT | Performed by: NURSE PRACTITIONER

## 2025-06-17 PROCEDURE — 36415 COLL VENOUS BLD VENIPUNCTURE: CPT | Performed by: NURSE PRACTITIONER

## 2025-06-17 PROCEDURE — 81003 URINALYSIS AUTO W/O SCOPE: CPT | Mod: 59 | Performed by: NURSE PRACTITIONER

## 2025-06-17 PROCEDURE — 83690 ASSAY OF LIPASE: CPT | Performed by: NURSE PRACTITIONER

## 2025-06-17 RX ORDER — MORPHINE SULFATE 4 MG/ML
2 INJECTION, SOLUTION INTRAMUSCULAR; INTRAVENOUS
Refills: 0 | Status: COMPLETED | OUTPATIENT
Start: 2025-06-17 | End: 2025-06-17

## 2025-06-17 RX ORDER — KETOROLAC TROMETHAMINE 30 MG/ML
15 INJECTION, SOLUTION INTRAMUSCULAR; INTRAVENOUS
Status: COMPLETED | OUTPATIENT
Start: 2025-06-17 | End: 2025-06-17

## 2025-06-17 RX ORDER — ONDANSETRON HYDROCHLORIDE 2 MG/ML
4 INJECTION, SOLUTION INTRAVENOUS
Status: COMPLETED | OUTPATIENT
Start: 2025-06-17 | End: 2025-06-17

## 2025-06-17 RX ADMIN — KETOROLAC TROMETHAMINE 15 MG: 30 INJECTION, SOLUTION INTRAMUSCULAR at 12:06

## 2025-06-17 RX ADMIN — PIPERACILLIN AND TAZOBACTAM 4.5 G: 4; .5 INJECTION, POWDER, LYOPHILIZED, FOR SOLUTION INTRAVENOUS; PARENTERAL at 01:06

## 2025-06-17 RX ADMIN — MORPHINE SULFATE 2 MG: 4 INJECTION INTRAVENOUS at 01:06

## 2025-06-17 RX ADMIN — SODIUM CHLORIDE 1000 ML: 9 INJECTION, SOLUTION INTRAVENOUS at 01:06

## 2025-06-17 RX ADMIN — ONDANSETRON 4 MG: 2 INJECTION INTRAMUSCULAR; INTRAVENOUS at 01:06

## 2025-06-17 NOTE — ED PROVIDER NOTES
Encounter Date: 6/17/2025       History     Chief Complaint   Patient presents with    Abdominal Pain     History of Present Illness    Patient Identification  Heidy Herndon is a 19 y.o. female.    Patient information was obtained from patient.  History/Exam limitations: none.  Patient presented to the Emergency Department by private vehicle.    Chief Complaint   Abdominal Pain            Abdominal Pain  The current episode started two days ago. The onset of the illness was gradual. The problem has been rapidly worsening. The abdominal pain is located in the RLQ. The abdominal pain radiates to the periumbilical region. The severity of the abdominal pain is 10/10. The abdominal pain is relieved by certain positions. The abdominal pain is exacerbated by certain positions and urination. The other symptoms of the illness include vaginal discharge. The other symptoms of the illness do not include fever, fatigue, nausea, vomiting, diarrhea, dysuria or vaginal bleeding.   The vaginal discharge was first noticed 2 days ago. Vaginal discharge is a new problem. The patient believes that the vaginal discharge is unchanged since it began. The amount of discharge is moderate. The color of the discharge is white. The vaginal discharge is associated with dyspareunia. The vaginal discharge is not associated with itching, burning or dysuria.   The illness is associated with recent sexual activity. The patient states that she believes she is currently not pregnant. The patient has not had a change in bowel habit. Symptoms associated with the illness do not include chills, anorexia, heartburn, constipation, urgency, hematuria, frequency or back pain.     Review of patient's allergies indicates:  No Known Allergies  History reviewed. No pertinent past medical history.  History reviewed. No pertinent surgical history.  No family history on file.  Social History[1]  Review of Systems   Constitutional:  Negative for chills, fatigue and  fever.   Gastrointestinal:  Positive for abdominal pain. Negative for anorexia, constipation, diarrhea, heartburn, nausea and vomiting.   Genitourinary:  Positive for dyspareunia and vaginal discharge. Negative for decreased urine volume, difficulty urinating, dysuria, flank pain, frequency, genital sores, hematuria, menstrual problem, pelvic pain, urgency, vaginal bleeding and vaginal pain.   Musculoskeletal:  Negative for back pain.   Skin:  Negative for itching.   Neurological:  Positive for light-headedness (associated with severe pain).       Physical Exam     Initial Vitals [06/17/25 1201]   BP Pulse Resp Temp SpO2   128/86 (!) 116 20 98.1 °F (36.7 °C) 99 %      MAP       --         Physical Exam    Vitals reviewed.  Constitutional: Vital signs are normal. She appears well-developed and well-nourished. She is cooperative. She appears distressed.   HENT:   Head: Normocephalic and atraumatic.   Right Ear: Hearing normal.   Left Ear: Hearing normal.   Nose: Nose normal. Mouth/Throat: Mucous membranes are normal.   Eyes: Conjunctivae and lids are normal. Right eye exhibits no nystagmus. Left eye exhibits no nystagmus.   Neck: Trachea normal.   Normal range of motion.  Cardiovascular:  Regular rhythm.   Tachycardia present.         No murmur heard.  Abdominal: There is abdominal tenderness in the right lower quadrant.   Musculoskeletal:      Cervical back: Normal range of motion.     Neurological: She is alert and oriented to person, place, and time. Gait normal.   Skin: Skin is warm.   Psychiatric: She has a normal mood and affect. Her speech is normal and behavior is normal. Judgment and thought content normal. Cognition and memory are normal.         Medical Screening Exam   See Full Note    ED Course   Procedures  Labs Reviewed   COMPREHENSIVE METABOLIC PANEL - Abnormal       Result Value    Sodium 141      Potassium 3.7      Chloride 109 (*)     CO2 23      Anion Gap 13      Glucose 96      BUN 13       Creatinine 1.05 (*)     BUN/Creatinine Ratio 12      Calcium 9.3      Total Protein 7.7      Albumin 4.5      Globulin 3.2      A/G Ratio 1.4      Bilirubin, Total 0.3      Alk Phos 47      ALT 11      AST 28      eGFR 78     CBC WITH DIFFERENTIAL - Abnormal    WBC 12.81 (*)     RBC 4.61      Hemoglobin 11.7 (*)     Hematocrit 37.1 (*)     MCV 80.5      MCH 25.4 (*)     MCHC 31.5 (*)     RDW 13.9      Platelet Count 347      MPV 10.2      Neutrophils % 69.6 (*)     Lymphocytes % 22.8 (*)     Neutrophils, Abs 8.92 (*)     Lymphocytes, Absolute 2.92      Diff Type Auto      Monocytes % 6.9 (*)     Eosinophils % 0.5 (*)     Basophils % 0.2      Monocytes, Absolute 0.89 (*)     Eosinophils, Absolute 0.06      Basophils, Absolute 0.02     DRUG SCREEN, URINE (BEAKER) - Normal    Barbiturates, Urine Negative      Benzodiazepine, Urine Negative      Opiates, Urine Negative      Phencyclidine, Urine Negative      Amphetamine, Urine Negative      Cannabinoid, Urine Negative      Cocaine, Urine Negative      Narrative:     This screen includes the following classes of drugs at the listed cut-off:    Benzodiazepines 200 ng/ml  Cocaine metabolite 300 ng/ml  Opiates 2000 ng/ml  Barbiturates 200 ng/ml  Amphetamines 500 ng/ml  Marijuana metabs (THC) 50 ng/ml  Phencyclidine (PCP) 25 ng/ml    This is a screening test. If results do not correlate with clinical presentation, then a confirmatory send out test is advised.   MAGNESIUM - Normal    Magnesium 2.0     LIPASE - Normal    Lipase 10     HCG QUALITATIVE URINE - Normal    HCG Qualitative, Urine Negative     CBC W/ AUTO DIFFERENTIAL    Narrative:     The following orders were created for panel order CBC auto differential.  Procedure                               Abnormality         Status                     ---------                               -----------         ------                     CBC with Differential[[id number]]       Abnormal            Final result                  Please view results for these tests on the individual orders.   URINALYSIS, REFLEX TO URINE CULTURE    Color, UA Yellow      Clarity, UA Clear      pH, UA 6.0      Leukocytes, UA Negative      Nitrites, UA Negative      Protein, UA Negative      Glucose, UA Negative      Ketones, UA Negative      Urobilinogen, UA 0.2      Bilirubin, UA Negative      Blood, UA Negative      Specific Gravity, UA 1.010            Imaging Results               US Pelvis Comp with Transvag NON-OB (xpd (Final result)  Result time 06/17/25 13:18:27   Procedure changed from US Pelvis Complete Non OB     Final result by Jaskaran Carter MD (06/17/25 13:18:27)                   Impression:      This report was flagged in Epic as abnormal.    There is a heterogeneous appearing focus within the right ovary, may reflect that of hemorrhagic cyst.  This occupies the majority of the right ovary, noting arterial and venous flow cannot be definitively documented to the right ovary.  Absence of detectable flow may be on the basis of displacement of the a very in parenchyma by the complex cyst however ovarian torsion cannot be excluded in this setting, correlation and follow-up is advised.      Electronically signed by: Jaskaran Carter MD  Date:    06/17/2025  Time:    13:18               Narrative:    EXAMINATION:  US PELVIS COMP WITH TRANSVAG NON-OB (XPD)    CLINICAL HISTORY:  abdominal pain;    TECHNIQUE:  Transabdominal sonography of the pelvis was performed, followed by transvaginal sonography to better evaluate the uterus and ovaries.    COMPARISON:  None.    FINDINGS:  The uterus measures approximately 6.4 x 2.8 x 4.5 cm.  The endometrial stripe is not thickened measuring 1.1 cm.  No abnormal endometrial hyperemia.    The left ovary measures approximately 3.4 x 2.4 x 3.1 cm and contains several follicles.  The right ovary measures approximately 4.5 x 4.1 x 4.4 cm and contains a heterogeneous avascular complex structure measuring  approximately 4.4 x 3.3 cm.  Arterial and venous flow is documented to the left ovary.  There is free fluid in the pelvis.                                       Medications   sodium chloride 0.9% bolus 1,000 mL 1,000 mL (1,000 mLs Intravenous New Bag 6/17/25 1304)   piperacillin-tazobactam (ZOSYN) 4.5 g in D5W 100 mL IVPB (MB+) (4.5 g Intravenous New Bag 6/17/25 1321)   ketorolac injection 15 mg (15 mg Intravenous Given 6/17/25 1226)   morphine injection 2 mg (2 mg Intravenous Given 6/17/25 1329)   ondansetron injection 4 mg (4 mg Intravenous Given 6/17/25 1329)     Medical Decision Making  This female patient presents with lateralized pelvic pain, concerning for torsion. DDX includes TOA, PID, and other infectious symptoms but patient has no constitutional symptoms of infection. Ectopic is on the differential but unlikely. Also includes UTI, pyelo, endometriosis, adenomyosis but these are less likely. Doubt appendicitis or other primary gastrointestinal process. WBC slightly elevated. Urinalysis negative for blood, bacteria, nitrites. Severe, pain tenderness to RLQ on exam. Some improvement with pain with Toradol. 1L NS bolus given. Zofran 4.5 gm x1 dose given. Pelvic ultrasound noted absence of detectable flow to right ovary and questionable cyst.     Problems Addressed:  Torsion of right ovary: acute illness or injury    Amount and/or Complexity of Data Reviewed  Labs: ordered. Decision-making details documented in ED Course.  Radiology: ordered.     Details: US Pelvis Comp with Transvag NON-OB (xpd  Narrative: EXAMINATION:  US PELVIS COMP WITH TRANSVAG NON-OB (XPD)    CLINICAL HISTORY:  abdominal pain;    TECHNIQUE:  Transabdominal sonography of the pelvis was performed, followed by transvaginal sonography to better evaluate the uterus and ovaries.    COMPARISON:  None.    FINDINGS:  The uterus measures approximately 6.4 x 2.8 x 4.5 cm.  The endometrial stripe is not thickened measuring 1.1 cm.  No abnormal  endometrial hyperemia.    The left ovary measures approximately 3.4 x 2.4 x 3.1 cm and contains several follicles.  The right ovary measures approximately 4.5 x 4.1 x 4.4 cm and contains a heterogeneous avascular complex structure measuring approximately 4.4 x 3.3 cm.  Arterial and venous flow is documented to the left ovary.  There is free fluid in the pelvis.  Impression: This report was flagged in Epic as abnormal.    There is a heterogeneous appearing focus within the right ovary, may reflect that of hemorrhagic cyst.  This occupies the majority of the right ovary, noting arterial and venous flow cannot be definitively documented to the right ovary.  Absence of detectable flow may be on the basis of displacement of the a very in parenchyma by the complex cyst however ovarian torsion cannot be excluded in this setting, correlation and follow-up is advised.    Electronically signed by: Jaskaran Carter MD  Date:    06/17/2025  Time:    13:18     Risk  Prescription drug management.  Decision regarding hospitalization.  Risk Details: Moderate risk               ED Course as of 06/17/25 1345   Tue Jun 17, 2025   1220 Color, UA: Yellow [MM]   1220 Appearance, UA: Clear [MM]   1220 pH, UA: 6.0 [MM]   1220 Leukocyte Esterase, UA: Negative [MM]   1220 NITRITE UA: Negative [MM]   1220 Protein, UA: Negative [MM]   1221 Glucose, UA: Negative [MM]   1221 Ketones, UA: Negative [MM]   1221 UROBILINOGEN UA: 0.2 [MM]   1221 Bilirubin (UA): Negative [MM]   1221 Blood, UA: Negative [MM]   1221 Spec Grav UA: 1.010 [MM]   1221 WBC(!): 12.81 [MM]   1221 RBC: 4.61 [MM]   1221 Hemoglobin(!): 11.7 [MM]   1221 Hematocrit(!): 37.1 [MM]   1221 MCV: 80.5 [MM]   1221 MCH(!): 25.4 [MM]   1221 MCHC(!): 31.5 [MM]   1221 Neutrophils Relative(!): 69.6 [MM]   1221 Lymph %(!): 22.8 [MM]   1221 Neutrophils, Abs(!): 8.92 [MM]   1223 hCG Qualitative, Urine: Negative [MM]   1228 BARBITURATES: Negative [MM]   1228 Benzodiazepines: Negative [MM]   1228  Opiates, Urine: Negative [MM]   1228 Phencyclidine, Urine: Negative [MM]   1228 Amphetamine, Urine: Negative [MM]   1228 Cannabinoid Scrn, Ur: Negative [MM]   1228 Cocaine: Negative [MM]   1249 Sodium: 141 [MM]   1251 Potassium: 3.7 [MM]   1251 Chloride(!): 109 [MM]   1251 Anion Gap: 13 [MM]   1251 Glucose: 96 [MM]   1251 BUN: 13 [MM]   1251 Creatinine(!): 1.05 [MM]   1251 ALP: 47 [MM]   1251 ALT: 11 [MM]   1251 AST: 28 [MM]   1251 eGFR: 78 [MM]   1251 Magnesium : 2.0 [MM]   1251 Lipase: 10 [MM]   1325 Spoke with Dr. Ruiz Michael (OB Hospitalist on call at [facility]). Reviewed HPI, exam, and workup. He recommended consulting [facility] due to the only option for him is surgical removal of patient's right ovary.  [MM]   1332 Contact [facility] to discuss need for transfer. Spoke with Dr. Shore who agreed to accept patient at [facility]. Patient and family member agree with plan of care. Will transfer via EMS. [MM]      ED Course User Index  [MM] Sonia Summers FNP                           Clinical Impression:   Final diagnoses:  [R10.31] Right lower quadrant abdominal pain  [N83.511] Torsion of right ovary (Primary)        ED Disposition Condition    Transfer to Another Facility Stable                    [1]   Social History  Tobacco Use    Smoking status: Every Day     Types: Vaping with nicotine    Smokeless tobacco: Never   Vaping Use    Vaping status: Every Day    Substances: Nicotine   Substance Use Topics    Alcohol use: Never    Drug use: Never        Sonia Summers FNP  06/17/25 2025

## 2025-06-17 NOTE — ED TRIAGE NOTES
Pt arrived to back door ER in POV doubled over with abd pain. Placed in exam 3. States this started on Saturday after having sex and has gotten worse each day since. Denies any n/v/d. Pain 10/10 at this time.